# Patient Record
Sex: MALE | Race: WHITE | NOT HISPANIC OR LATINO | Employment: FULL TIME | URBAN - METROPOLITAN AREA
[De-identification: names, ages, dates, MRNs, and addresses within clinical notes are randomized per-mention and may not be internally consistent; named-entity substitution may affect disease eponyms.]

---

## 2022-06-30 ENCOUNTER — LAB (OUTPATIENT)
Dept: LAB | Facility: HOSPITAL | Age: 47
End: 2022-06-30

## 2022-06-30 ENCOUNTER — TRANSCRIBE ORDERS (OUTPATIENT)
Dept: ADMINISTRATIVE | Facility: HOSPITAL | Age: 47
End: 2022-06-30

## 2022-06-30 DIAGNOSIS — Z79.899 ENCOUNTER FOR LONG-TERM (CURRENT) USE OF OTHER MEDICATIONS: Primary | ICD-10-CM

## 2022-06-30 DIAGNOSIS — Z79.899 ENCOUNTER FOR LONG-TERM (CURRENT) USE OF OTHER MEDICATIONS: ICD-10-CM

## 2022-06-30 LAB
AMPHET+METHAMPHET UR QL: NEGATIVE
AMPHETAMINES UR QL: NEGATIVE
BARBITURATES UR QL SCN: NEGATIVE
BENZODIAZ UR QL SCN: NEGATIVE
BUPRENORPHINE SERPL-MCNC: NEGATIVE NG/ML
CANNABINOIDS SERPL QL: NEGATIVE
COCAINE UR QL: NEGATIVE
METHADONE UR QL SCN: NEGATIVE
OPIATES UR QL: NEGATIVE
OXYCODONE UR QL SCN: NEGATIVE
PCP UR QL SCN: NEGATIVE
PROPOXYPH UR QL: NEGATIVE
TRICYCLICS UR QL SCN: NEGATIVE

## 2022-06-30 PROCEDURE — 80306 DRUG TEST PRSMV INSTRMNT: CPT

## 2022-07-11 ENCOUNTER — TRANSCRIBE ORDERS (OUTPATIENT)
Dept: ADMINISTRATIVE | Facility: HOSPITAL | Age: 47
End: 2022-07-11

## 2022-07-11 ENCOUNTER — LAB (OUTPATIENT)
Dept: LAB | Facility: HOSPITAL | Age: 47
End: 2022-07-11

## 2022-07-11 DIAGNOSIS — Z79.899 ENCOUNTER FOR LONG-TERM (CURRENT) USE OF OTHER MEDICATIONS: ICD-10-CM

## 2022-07-11 DIAGNOSIS — Z79.899 ENCOUNTER FOR LONG-TERM (CURRENT) USE OF OTHER MEDICATIONS: Primary | ICD-10-CM

## 2022-07-11 PROCEDURE — G0483 DRUG TEST DEF 22+ CLASSES: HCPCS

## 2022-07-11 PROCEDURE — 80307 DRUG TEST PRSMV CHEM ANLYZR: CPT

## 2022-07-16 LAB — DRUGS UR: NORMAL

## 2022-09-13 ENCOUNTER — TRANSCRIBE ORDERS (OUTPATIENT)
Dept: ADMINISTRATIVE | Facility: HOSPITAL | Age: 47
End: 2022-09-13

## 2022-09-13 DIAGNOSIS — N28.9 DISORDER OF KIDNEY: Primary | ICD-10-CM

## 2023-09-12 ENCOUNTER — TELEMEDICINE (OUTPATIENT)
Dept: FAMILY MEDICINE CLINIC | Facility: TELEHEALTH | Age: 48
End: 2023-09-12
Payer: MEDICAID

## 2023-09-12 DIAGNOSIS — J01.41 ACUTE RECURRENT PANSINUSITIS: Primary | ICD-10-CM

## 2023-09-12 RX ORDER — LORATADINE 10 MG/1
10 TABLET ORAL DAILY
Qty: 30 TABLET | Refills: 0 | Status: SHIPPED | OUTPATIENT
Start: 2023-09-12

## 2023-09-12 RX ORDER — AMOXICILLIN AND CLAVULANATE POTASSIUM 875; 125 MG/1; MG/1
1 TABLET, FILM COATED ORAL 2 TIMES DAILY
Qty: 20 TABLET | Refills: 0 | Status: SHIPPED | OUTPATIENT
Start: 2023-09-12 | End: 2023-09-22

## 2023-09-12 NOTE — LETTER
September 12, 2023     Patient: Valentin Valdez   YOB: 1975   Date of Visit: 9/12/2023       To Whom It May Concern:    It is my medical opinion that Valentin Valdez  should be excused from work on 9/12, 9/13 and 9/14/23 .           Sincerely,        VARINDER Bryan    CC:   No Recipients

## 2023-09-12 NOTE — PROGRESS NOTES
ANKUSH Valdez is a 48 y.o. male  presents with two week complaint of sinus pressure, sore throat from PND, chest tightness, productive cough, headache, chills. Temp 97.8 but had sweats so feels like he had a fever and it broke. Completed a zpak on 9/10/23 for sinus infection. No known covid or flu exposure. Tested negative for covid on 9/9/23. Has had frequent episodes of sinus infections since moving to [place] three years ago. Was seen by ENT.     Review of Systems    Past Medical History:   Diagnosis Date    Anxiety     GERD (gastroesophageal reflux disease)        No family history on file.    Social History     Socioeconomic History    Marital status:    Tobacco Use    Smoking status: Some Days     Packs/day: 0.50     Years: 30.00     Pack years: 15.00     Types: Cigarettes    Smokeless tobacco: Never   Substance and Sexual Activity    Alcohol use: Never    Drug use: Never    Sexual activity: Defer         There were no vitals taken for this visit.    PHYSICAL EXAM  Physical Exam   Constitutional: He appears well-developed and well-nourished.   HENT:   Head: Normocephalic.   Nose: Nose normal. Right sinus exhibits maxillary sinus tenderness and frontal sinus tenderness. Left sinus exhibits maxillary sinus tenderness and frontal sinus tenderness.   Neck: Neck normal appearance.  Pulmonary/Chest: Effort normal.   Neurological: He is alert.   Psychiatric: He has a normal mood and affect. His speech is normal.     Diagnoses and all orders for this visit:    1. Acute recurrent pansinusitis (Primary)  -     loratadine (Claritin) 10 MG tablet; Take 1 tablet by mouth Daily.  Dispense: 30 tablet; Refill: 0  -     amoxicillin-clavulanate (AUGMENTIN) 875-125 MG per tablet; Take 1 tablet by mouth 2 (Two) Times a Day for 10 days.  Dispense: 20 tablet; Refill: 0      *Start using flonase as previously prescribed    FOLLOW-UP  As discussed during visit with [facility], if symptoms worsen or fail to improve,  follow-up with PCP/Urgent Care/Emergency Department.    Patient verbalizes understanding of medications, instructions for treatment and follow-up.    Jelena Carrion, APRN  09/12/2023  19:32 EDT    The use of a video visit has been reviewed with the patient and verbal informed consent has been obtained. Myself and Valentin Valdez participated in this visit. The patient is located in [place], [place], and I am located in [place], [place]. [employer] and [employer] were utilized.

## 2023-12-16 ENCOUNTER — TELEMEDICINE (OUTPATIENT)
Dept: FAMILY MEDICINE CLINIC | Facility: TELEHEALTH | Age: 48
End: 2023-12-16

## 2023-12-16 DIAGNOSIS — J01.40 ACUTE PANSINUSITIS, RECURRENCE NOT SPECIFIED: Primary | ICD-10-CM

## 2023-12-16 DIAGNOSIS — J01.41 ACUTE RECURRENT PANSINUSITIS: ICD-10-CM

## 2023-12-16 RX ORDER — AMOXICILLIN AND CLAVULANATE POTASSIUM 875; 125 MG/1; MG/1
1 TABLET, FILM COATED ORAL 2 TIMES DAILY
Qty: 20 TABLET | Refills: 0 | Status: SHIPPED | OUTPATIENT
Start: 2023-12-16 | End: 2023-12-26

## 2023-12-16 RX ORDER — METHYLPREDNISOLONE 4 MG/1
TABLET ORAL
Qty: 21 TABLET | Refills: 0 | Status: SHIPPED | OUTPATIENT
Start: 2023-12-16

## 2023-12-16 RX ORDER — LORATADINE 10 MG/1
10 TABLET ORAL DAILY
Qty: 30 TABLET | Refills: 0 | Status: SHIPPED | OUTPATIENT
Start: 2023-12-16

## 2023-12-16 NOTE — PROGRESS NOTES
You have chosen to receive care through a telehealth visit.  Do you consent to use a video/audio connection for your medical care today? Yes     CHIEF COMPLAINT  No chief complaint on file.        HPI  Valentin Valdez is a 48 y.o. male  presents with complaint of several days history of PND, sore throat, nasal congestion with yellow/bloody discharge, facial pressure/pain, cough, chills.  Denies fever, wheezing, shortness of breath.     Review of Systems  See HPI    Past Medical History:   Diagnosis Date    Anxiety     GERD (gastroesophageal reflux disease)        No family history on file.    Social History     Socioeconomic History    Marital status:    Tobacco Use    Smoking status: Some Days     Packs/day: 0.50     Years: 30.00     Additional pack years: 0.00     Total pack years: 15.00     Types: Cigarettes    Smokeless tobacco: Never   Substance and Sexual Activity    Alcohol use: Never    Drug use: Never    Sexual activity: Defer       Valentin Valdez  reports that he has been smoking. He has a 15.00 pack-year smoking history. He has never used smokeless tobacco..              There were no vitals taken for this visit.    PHYSICAL EXAM  Physical Exam   Constitutional: He is oriented to person, place, and time. He appears well-developed and well-nourished. He does not have a sickly appearance. He does not appear ill.   HENT:   Head: Normocephalic and atraumatic.   Pulmonary/Chest: Effort normal.  No respiratory distress.  Neurological: He is alert and oriented to person, place, and time.       Results for orders placed or performed in visit on 07/11/22   Compliance Drug Analysis, Ur - Urine, Clean Catch    Specimen: Urine, Clean Catch   Result Value Ref Range    Report Summary FINAL        Diagnoses and all orders for this visit:    1. Acute pansinusitis, recurrence not specified (Primary)  -     amoxicillin-clavulanate (AUGMENTIN) 875-125 MG per tablet; Take 1 tablet by mouth 2 (Two) Times a Day for 10  days.  Dispense: 20 tablet; Refill: 0  -     methylPREDNISolone (MEDROL) 4 MG dose pack; Take as directed on package instructions.  Dispense: 21 tablet; Refill: 0    2. Acute recurrent pansinusitis  -     loratadine (Claritin) 10 MG tablet; Take 1 tablet by mouth Daily.  Dispense: 30 tablet; Refill: 0    --take medications as prescribed  --increase fluids, rest as needed, tylenol or ibuprofen for pain  --f/u in 5-7 days if no improvement        FOLLOW-UP  As discussed during visit with PCP/[facility] if no improvement or Urgent Care/Emergency Department if worsening of symptoms    Patient verbalizes understanding of medication dosage, comfort measures, instructions for treatment and follow-up.    Lorena Bryant, APRN  12/16/2023  13:25 EST    The use of a video visit has been reviewed with the patient and verbal informed consent has been obtained. Myself and Valentin Valdez participated in this visit. The patient is located in [address] [place] [address].    I am located in [place], [place]. [employer] and [employer] were utilized. I spent 8 minutes in the patient's chart for this visit.

## 2023-12-29 ENCOUNTER — TELEMEDICINE (OUTPATIENT)
Dept: FAMILY MEDICINE CLINIC | Facility: TELEHEALTH | Age: 48
End: 2023-12-29

## 2023-12-29 VITALS — BODY MASS INDEX: 33.06 KG/M2 | HEIGHT: 77 IN | WEIGHT: 280 LBS

## 2023-12-29 DIAGNOSIS — N39.0 URINARY TRACT INFECTION WITHOUT HEMATURIA, SITE UNSPECIFIED: Primary | ICD-10-CM

## 2023-12-29 RX ORDER — SULFAMETHOXAZOLE AND TRIMETHOPRIM 800; 160 MG/1; MG/1
1 TABLET ORAL 2 TIMES DAILY
Qty: 20 TABLET | Refills: 0 | Status: SHIPPED | OUTPATIENT
Start: 2023-12-29 | End: 2024-01-08

## 2023-12-29 RX ORDER — FLUOXETINE HYDROCHLORIDE 20 MG/1
CAPSULE ORAL
COMMUNITY
Start: 2023-11-11

## 2023-12-29 NOTE — PROGRESS NOTES
"You have chosen to receive care through a telehealth visit.  Do you consent to use a video/audio connection for your medical care today? Yes     ANKUSH Valdez is a 48 y.o. male  presents with complaint of urinary problem. He reports pain in his back on both sides and urgency to use the bathroom. He does have a history of kidney stones and reports that his symptoms do not feel like a kidney stone. He reports mild burning with urination, urinary frequency and urinary urgency. He does have history of urinary tract infections. His symptoms  started 3 days ago. He has not taken anything for his symptoms.     Review of Systems   Constitutional:  Positive for chills (at times) and fever (unknown). Negative for fatigue.   Gastrointestinal:  Negative for diarrhea and nausea.   Genitourinary:  Positive for dysuria (mild), flank pain, frequency and urgency. Negative for hematuria, penile swelling and testicular pain.   Musculoskeletal:  Positive for back pain.       Past Medical History:   Diagnosis Date    Anxiety     GERD (gastroesophageal reflux disease)        No family history on file.    Social History     Socioeconomic History    Marital status:    Tobacco Use    Smoking status: Some Days     Packs/day: 0.50     Years: 30.00     Additional pack years: 0.00     Total pack years: 15.00     Types: Cigarettes    Smokeless tobacco: Never   Substance and Sexual Activity    Alcohol use: Never    Drug use: Never    Sexual activity: Defer       Valentin Valdez  reports that he has been smoking. He has a 15.00 pack-year smoking history. He has never used smokeless tobacco.. I have educated him on the risk of diseases from using tobacco products such as cancer, COPD, and heart disease.     I advised him to quit and he is not willing to quit.    I spent 3  minutes counseling the patient.       Ht 195.6 cm (77\")   Wt 127 kg (280 lb)   BMI 33.20 kg/m²     PHYSICAL EXAM  Physical Exam   Constitutional: He is oriented " to person, place, and time. He appears well-developed.   HENT:   Head: Normocephalic and atraumatic.   Nose: Nose normal.   Eyes: Lids are normal. Right eye exhibits no discharge. Left eye exhibits no discharge. Right conjunctiva is not injected. Left conjunctiva is not injected.   Pulmonary/Chest:  No respiratory distress.  Abdominal: There is CVA tenderness.   Neurological: He is alert and oriented to person, place, and time. No cranial nerve deficit.   Psychiatric: He has a normal mood and affect. His speech is normal and behavior is normal. Judgment and thought content normal.       Results for orders placed or performed in visit on 07/11/22   Compliance Drug Analysis, Ur - Urine, Clean Catch    Specimen: Urine, Clean Catch   Result Value Ref Range    Report Summary FINAL        Diagnoses and all orders for this visit:    1. Urinary tract infection without hematuria, site unspecified (Primary)    Other orders  -     sulfamethoxazole-trimethoprim (Bactrim DS) 800-160 MG per tablet; Take 1 tablet by mouth 2 (Two) Times a Day for 10 days.  Dispense: 20 tablet; Refill: 0    Probiotics for one month related to taking antibiotics. The pharmacist can help you with this if needed. Do not take within two hours of antibiotic.  Drink plenty of of clear decaffeinated fluids    FOLLOW-UP  If symptoms worsen or persist follow up with PC or Urgent Care for in person evaluation    If back pain increases, temperature spikes and you think you may have a kidney stone proceed to ER    Patient verbalizes understanding of medication dosage, comfort measures, instructions for treatment and follow-up.    Nunu Nicholson, VARINDER  12/29/2023  14:40 EST    The use of a video visit has been reviewed with the patient and verbal informed consent has been obtained. Myself and Valentin Valdez participated in this visit. The patient is located in [address] [place] [address].    I am located in [place], [place]. [employer] and [employer]  Client were utilized. I spent 25 minutes in the patient's chart for this visit.

## 2024-09-05 ENCOUNTER — TELEMEDICINE (OUTPATIENT)
Dept: FAMILY MEDICINE CLINIC | Facility: TELEHEALTH | Age: 49
End: 2024-09-05

## 2024-09-05 DIAGNOSIS — J01.00 ACUTE NON-RECURRENT MAXILLARY SINUSITIS: Primary | ICD-10-CM

## 2024-09-05 DIAGNOSIS — Z20.822 CLOSE EXPOSURE TO COVID-19 VIRUS: ICD-10-CM

## 2024-09-05 DIAGNOSIS — F41.9 ANXIETY: ICD-10-CM

## 2024-09-05 PROCEDURE — 99213 OFFICE O/P EST LOW 20 MIN: CPT | Performed by: NURSE PRACTITIONER

## 2024-09-05 NOTE — PROGRESS NOTES
ANKUSH Valdez is a 49 y.o. male  presents with complaint of sinus pressure, congestion, stuffy nose, bloody/yellow nasal drainage over the last week. Feels the same as past sinus infections. Has frequent sinus infections since moving here from [place]. Was also exposed to covid earlier this week but has not tested yet. Has had intermittent fever x2 days with bodyaches and sweats.    Also has history of anxiety and would like referral to [facility].       Review of Systems    Past Medical History:   Diagnosis Date    Anxiety     GERD (gastroesophageal reflux disease)        No family history on file.    Social History     Socioeconomic History    Marital status:    Tobacco Use    Smoking status: Some Days     Current packs/day: 0.50     Average packs/day: 0.5 packs/day for 30.0 years (15.0 ttl pk-yrs)     Types: Cigarettes    Smokeless tobacco: Never   Substance and Sexual Activity    Alcohol use: Never    Drug use: Never    Sexual activity: Defer         There were no vitals taken for this visit.    PHYSICAL EXAM  Physical Exam   Constitutional: He appears well-developed and well-nourished.   HENT:   Head: Normocephalic.   Nose: Nose normal. Right sinus exhibits maxillary sinus tenderness. Left sinus exhibits maxillary sinus tenderness.   Neck: Neck normal appearance.  Pulmonary/Chest: Effort normal.   Neurological: He is alert.   Psychiatric: He has a normal mood and affect. His speech is normal.       Diagnoses and all orders for this visit:    1. Acute non-recurrent maxillary sinusitis (Primary)  -     amoxicillin-clavulanate (AUGMENTIN) 875-125 MG per tablet; Take 1 tablet by mouth 2 (Two) Times a Day for 7 days.  Dispense: 14 tablet; Refill: 0    2. Close exposure to COVID-19 virus    3. Anxiety  -     Ambulatory Referral to Behavioral Health          FOLLOW-UP  As discussed during visit with [facility], if symptoms worsen or fail to improve, follow-up with PCP/Urgent Care/Emergency  Department.    Patient verbalizes understanding of medications, instructions for treatment and follow-up.    Jelena Wynne Murali, APRN  09/05/2024  14:58 EDT    The use of a video visit has been reviewed with the patient and verbal informed consent has been obtained. Myself and Valentin Valdez participated in this visit. The patient is located in [place], [place], and I am located in [place], [place]. [employer] and [employer] Client were utilized.

## 2024-09-05 NOTE — LETTER
September 5, 2024     Patient: Valentin Valdez   YOB: 1975   Date of Visit: 9/5/2024       To Whom It May Concern:    It is my medical opinion that Valentin Valdez  should be excused from work on 9/3/24-9/6/24 .           Sincerely,        VARINDER Bryan    CC:   No Recipients